# Patient Record
Sex: FEMALE | HISPANIC OR LATINO
[De-identification: names, ages, dates, MRNs, and addresses within clinical notes are randomized per-mention and may not be internally consistent; named-entity substitution may affect disease eponyms.]

---

## 2021-07-09 ENCOUNTER — APPOINTMENT (OUTPATIENT)
Age: 21
End: 2021-07-09

## 2023-12-13 ENCOUNTER — APPOINTMENT (OUTPATIENT)
Dept: ANTEPARTUM | Facility: CLINIC | Age: 23
End: 2023-12-13
Payer: MEDICAID

## 2023-12-13 ENCOUNTER — ASOB RESULT (OUTPATIENT)
Age: 23
End: 2023-12-13

## 2023-12-13 PROCEDURE — 76801 OB US < 14 WKS SINGLE FETUS: CPT

## 2024-03-04 ENCOUNTER — APPOINTMENT (OUTPATIENT)
Dept: ANTEPARTUM | Facility: CLINIC | Age: 24
End: 2024-03-04
Payer: MEDICAID

## 2024-03-04 ENCOUNTER — ASOB RESULT (OUTPATIENT)
Age: 24
End: 2024-03-04

## 2024-03-04 PROCEDURE — 76811 OB US DETAILED SNGL FETUS: CPT

## 2024-03-04 PROCEDURE — 76817 TRANSVAGINAL US OBSTETRIC: CPT | Mod: 59

## 2024-03-05 PROBLEM — Z00.00 ENCOUNTER FOR PREVENTIVE HEALTH EXAMINATION: Status: ACTIVE | Noted: 2024-03-05

## 2024-03-21 ENCOUNTER — APPOINTMENT (OUTPATIENT)
Dept: ANTEPARTUM | Facility: CLINIC | Age: 24
End: 2024-03-21
Payer: MEDICAID

## 2024-03-21 ENCOUNTER — ASOB RESULT (OUTPATIENT)
Age: 24
End: 2024-03-21

## 2024-03-21 PROCEDURE — 76816 OB US FOLLOW-UP PER FETUS: CPT

## 2024-05-06 ENCOUNTER — APPOINTMENT (OUTPATIENT)
Dept: ANTEPARTUM | Facility: CLINIC | Age: 24
End: 2024-05-06
Payer: MEDICAID

## 2024-05-06 ENCOUNTER — ASOB RESULT (OUTPATIENT)
Age: 24
End: 2024-05-06

## 2024-05-06 PROCEDURE — 76817 TRANSVAGINAL US OBSTETRIC: CPT

## 2024-05-06 PROCEDURE — 76816 OB US FOLLOW-UP PER FETUS: CPT

## 2024-06-13 ENCOUNTER — APPOINTMENT (OUTPATIENT)
Dept: ANTEPARTUM | Facility: CLINIC | Age: 24
End: 2024-06-13
Payer: MEDICAID

## 2024-06-13 ENCOUNTER — ASOB RESULT (OUTPATIENT)
Age: 24
End: 2024-06-13

## 2024-06-13 PROCEDURE — 76819 FETAL BIOPHYS PROFIL W/O NST: CPT | Mod: 59

## 2024-06-13 PROCEDURE — 76816 OB US FOLLOW-UP PER FETUS: CPT

## 2024-07-12 RX ORDER — AMPICILLIN TRIHYDRATE 250 MG
1 CAPSULE ORAL EVERY 4 HOURS
Refills: 0 | Status: DISCONTINUED | OUTPATIENT
Start: 2024-07-15 | End: 2024-07-17

## 2024-07-12 RX ORDER — OXYTOCIN 30 [USP'U]/500ML
INJECTION, SOLUTION INTRAVENOUS
Qty: 20 | Refills: 0 | Status: COMPLETED | OUTPATIENT
Start: 2024-07-15 | End: 2024-07-15

## 2024-07-12 RX ORDER — TRISODIUM CITRATE DIHYDRATE AND CITRIC ACID MONOHYDRATE 500; 334 MG/5ML; MG/5ML
30 SOLUTION ORAL ONCE
Refills: 0 | Status: DISCONTINUED | OUTPATIENT
Start: 2024-07-15 | End: 2024-07-17

## 2024-07-12 RX ORDER — DEXTROSE MONOHYDRATE AND SODIUM CHLORIDE 5; .3 G/100ML; G/100ML
1000 INJECTION, SOLUTION INTRAVENOUS
Refills: 0 | Status: DISCONTINUED | OUTPATIENT
Start: 2024-07-15 | End: 2024-07-17

## 2024-07-15 ENCOUNTER — INPATIENT (INPATIENT)
Facility: HOSPITAL | Age: 24
LOS: 3 days | Discharge: HOME | End: 2024-07-19
Attending: OBSTETRICS & GYNECOLOGY | Admitting: OBSTETRICS & GYNECOLOGY
Payer: COMMERCIAL

## 2024-07-15 VITALS — DIASTOLIC BLOOD PRESSURE: 58 MMHG | SYSTOLIC BLOOD PRESSURE: 102 MMHG | HEART RATE: 64 BPM

## 2024-07-15 LAB
ABO RH CONFIRMATION: SIGNIFICANT CHANGE UP
BASOPHILS # BLD AUTO: 0.02 K/UL — SIGNIFICANT CHANGE UP (ref 0–0.2)
BASOPHILS NFR BLD AUTO: 0.2 % — SIGNIFICANT CHANGE UP (ref 0–2)
BLD GP AB SCN SERPL QL: SIGNIFICANT CHANGE UP
EOSINOPHIL # BLD AUTO: 0.03 K/UL — SIGNIFICANT CHANGE UP (ref 0–0.5)
EOSINOPHIL NFR BLD AUTO: 0.4 % — SIGNIFICANT CHANGE UP (ref 0–6)
HCT VFR BLD CALC: 40 % — SIGNIFICANT CHANGE UP (ref 34.5–45)
HGB BLD-MCNC: 13.9 G/DL — SIGNIFICANT CHANGE UP (ref 11.5–15.5)
IMM GRANULOCYTES NFR BLD AUTO: 0.4 % — SIGNIFICANT CHANGE UP (ref 0–0.9)
LYMPHOCYTES # BLD AUTO: 1.83 K/UL — SIGNIFICANT CHANGE UP (ref 1–3.3)
LYMPHOCYTES # BLD AUTO: 21.7 % — SIGNIFICANT CHANGE UP (ref 13–44)
MCHC RBC-ENTMCNC: 31.8 PG — SIGNIFICANT CHANGE UP (ref 27–34)
MCHC RBC-ENTMCNC: 34.8 GM/DL — SIGNIFICANT CHANGE UP (ref 32–36)
MCV RBC AUTO: 91.5 FL — SIGNIFICANT CHANGE UP (ref 80–100)
MONOCYTES # BLD AUTO: 0.89 K/UL — SIGNIFICANT CHANGE UP (ref 0–0.9)
MONOCYTES NFR BLD AUTO: 10.5 % — SIGNIFICANT CHANGE UP (ref 2–14)
NEUTROPHILS # BLD AUTO: 5.65 K/UL — SIGNIFICANT CHANGE UP (ref 1.8–7.4)
NEUTROPHILS NFR BLD AUTO: 66.8 % — SIGNIFICANT CHANGE UP (ref 43–77)
PLATELET # BLD AUTO: 250 K/UL — SIGNIFICANT CHANGE UP (ref 150–400)
RBC # BLD: 4.37 M/UL — SIGNIFICANT CHANGE UP (ref 3.8–5.2)
RBC # FLD: 13.2 % — SIGNIFICANT CHANGE UP (ref 10.3–14.5)
WBC # BLD: 8.45 K/UL — SIGNIFICANT CHANGE UP (ref 3.8–10.5)
WBC # FLD AUTO: 8.45 K/UL — SIGNIFICANT CHANGE UP (ref 3.8–10.5)

## 2024-07-15 RX ORDER — AMPICILLIN TRIHYDRATE 250 MG
2 CAPSULE ORAL ONCE
Refills: 0 | Status: COMPLETED | OUTPATIENT
Start: 2024-07-15 | End: 2024-07-15

## 2024-07-15 RX ADMIN — Medication 108 GRAM(S): at 21:33

## 2024-07-15 RX ADMIN — Medication 200 GRAM(S): at 17:27

## 2024-07-15 RX ADMIN — DEXTROSE MONOHYDRATE AND SODIUM CHLORIDE 125 MILLILITER(S): 5; .3 INJECTION, SOLUTION INTRAVENOUS at 17:30

## 2024-07-15 NOTE — OB PROVIDER H&P - NSHPPHYSICALEXAM_GEN_ALL_CORE
ICU Vital Signs Last 24 Hrs  T(C): 36.9 (15 Jul 2024 16:43), Max: 36.9 (15 Jul 2024 16:43)  T(F): 98.4 (15 Jul 2024 16:43), Max: 98.4 (15 Jul 2024 16:43)  HR: 64 (15 Jul 2024 16:43) (64 - 64)  BP: 102/58 (15 Jul 2024 16:43) (102/58 - 102/58)-  RR: 17 (15 Jul 2024 16:43) (17 - 17)  SpO2: 99%    O2 Parameters below as of 15 Jul 2024 16:43  Patient On (Oxygen Delivery Method): room air      Gen: NAD, well-appearing, AAOx3   Abd: Soft, gravid  Ext: non-tender, non-edematous ICU Vital Signs Last 24 Hrs  T(C): 36.9 (15 Jul 2024 16:43), Max: 36.9 (15 Jul 2024 16:43)  T(F): 98.4 (15 Jul 2024 16:43), Max: 98.4 (15 Jul 2024 16:43)  HR: 64 (15 Jul 2024 16:43) (64 - 64)  BP: 102/58 (15 Jul 2024 16:43) (102/58 - 102/58)-  RR: 17 (15 Jul 2024 16:43) (17 - 17)  SpO2: 99%    O2 Parameters below as of 15 Jul 2024 16:43  Patient On (Oxygen Delivery Method): room air    Gen: NAD, well-appearing, AAOx3   Abd: Soft, gravid  Ext: non-tender, non-edematous  SVE: 1/40/-3 ICU Vital Signs Last 24 Hrs  T(C): 36.9 (15 Jul 2024 16:43), Max: 36.9 (15 Jul 2024 16:43)  T(F): 98.4 (15 Jul 2024 16:43), Max: 98.4 (15 Jul 2024 16:43)  HR: 64 (15 Jul 2024 16:43) (64 - 64)  BP: 102/58 (15 Jul 2024 16:43) (102/58 - 102/58)-  RR: 17 (15 Jul 2024 16:43) (17 - 17)  SpO2: 99%    O2 Parameters below as of 15 Jul 2024 16:43  Patient On (Oxygen Delivery Method): room air    Gen: NAD, well-appearing, AAOx3   Abd: Soft, gravid  Ext: non-tender, non-edematous  SVE: 1/30/-3, soft, anterior

## 2024-07-15 NOTE — OB PROVIDER H&P - NS_PRENATALLABSOURCEHEPATITISCPN_OBGYN_ALL_OB
Detail Level: Zone Initiate Treatment: -\\n- Mupirocin ointment: apply to the affected areas 2-5x a day until resolved\\n- Doxycycline 100 mg: Take 1 capsule po BID x 7 days with food and water Render In Strict Bullet Format?: No Initiate Treatment: -\\n- Triamcinolone 0.1% cream: apply to the affected areas up to bid x 2 weeks on, 1 week off. Repeat prn for flares. Avoid face/groin/armpits negative

## 2024-07-15 NOTE — OB PROVIDER H&P - NSLOWPPHRISK_OBGYN_A_OB
No previous uterine incision/[place] Pregnancy/Less than or equal to 4 previous vaginal births/No other PPH risks indicated

## 2024-07-15 NOTE — OB RN PATIENT PROFILE - NS_PREOPBLOODCONS_OBGYN_ALL_OB
From: Jewel Mendoza  To: John Paul Holder Bryon Mendoza  
n/a

## 2024-07-15 NOTE — OB RN DELIVERY SUMMARY - NSSELHIDDEN_OBGYN_ALL_OB_FT
[NS_DeliveryAttending1_OBGYN_ALL_OB_FT:[id number]=],[NS_DeliveryRN_OBGYN_ALL_OB_FT:[id number]=] [NS_DeliveryAttending1_OBGYN_ALL_OB_FT:[email or web address]=],[NS_DeliveryRN_OBGYN_ALL_OB_FT:[id number]=],[NS_DeliveryAssist1_OBGYN_ALL_OB_FT:[id number]=]

## 2024-07-15 NOTE — OB PROVIDER H&P - HISTORY OF PRESENT ILLNESS
Patient is a 23 year old  at 40w5d by LMP who presents to L&D for elective IOL.       MENA:  7/10/24     LMP: 10/4/23       Pregnancy course: low lying placenta resolved        Obhx:  G1- current pregnancy      Gynhx: denies fibroids, cysts, STIs, abnl paps     Pmhx: denies     Pshx: denies     Meds: PNV, ASA     Allergies: NKDA     Social Hx: denies tobacco, recreational drug, alcohol use during pregnancy. Feels safe at home       Ultrasound:  vertex, posterior placenta      FHT: Category Fetal I tracing    TOCO: regular q8    EFW: 3627g Patient is a 23 year old  at 40w5d by LMP who presents to L&D for elective IOL.   MENA:  7/10/24   LMP: 10/4/23     Pregnancy course: low lying placenta resolved    Obhx:  G1- current pregnancy    Gynhx: denies fibroids, cysts, STIs, abnl paps   Pmhx: denies   Pshx: denies     Meds: PNV, ASA   Allergies: NKDA   Social Hx: denies tobacco, recreational drug, alcohol use during pregnancy. Feels safe at home     Ultrasound:  vertex, posterior placenta    FHT: Category Fetal I tracing    TOCO: irregular every 8min    EFW: 3627g

## 2024-07-15 NOTE — OB PROVIDER H&P - ATTENDING COMMENTS
Primip with IUP @ 52lsf1y admitted for IOL for post dates, EFW about 3700g. GBSpos. Maternal/fetal status reassuring.    -admit  -unfavorable elizabeth score- will start buccal cytotec  -regular diet  -routine admission orders and labs  -GBS pos will be started on Amp for prophylaxis   -desires for nexplanon offered after delivery, desires placement at health center  -we discussed in detail induction of labor, labor, pain management, mode of delivery. All concerns and questions were discussed in detail to patient's satisfaction  -consents explained in detail and signed  ppalos

## 2024-07-15 NOTE — OB RN PATIENT PROFILE - PRO FEEDING PLAN INFANT OB
Tc to the pt. He declined the offer of an . He spoke [place]. The pt was given the entire lab message from the provider. Pt indicated understanding and had no questions.  Eliceo Chen RN initiation of breastfeeding/breast milk feeding

## 2024-07-15 NOTE — OB PROVIDER H&P - ASSESSMENT
A/P: 23Y  @ 40w5d presented for TIOL:     -Admit to L&D  -Consent  -Admission labs  -NPO, except ice chips   -IV fluids  -Labor: Intact Latent labor. Onesimo q8   -Fetus: Cat I tracing. Continuous toco and fetal monitoring.        A/P: 23Y  @ 40w5d presented for TIOL:     -Admit to L&D  -Consented  -Admission labs  -Regular diet  -IV fluids  -Labor: Intact Latent labor. Onesimo q8   -Buccal cyto ordered  -Fetus: Cat I tracing. Continuous toco and fetal monitoring.        A/P: 23Y  @ 40w5d presented for TIOL:     -Admit to L&D  -Consented  -Admission labs  -Regular diet  -IV fluids  -Labor: not in labor. Onesimo irregularly   -Buccal cyto ordered  -Fetus: Cat I tracing. Continuous toco and fetal monitoring.

## 2024-07-15 NOTE — OB RN DELIVERY SUMMARY - NS_SEPSISRSKCALC_OBGYN_ALL_OB_FT
EOS calculated successfully. EOS Risk Factor: 0.5/1000 live births ([facility] national incidence); GA=41w;Temp=100.22; ROM=15.683; GBS='Positive'; Antibiotics='GBS specific antibiotics > 2 hrs prior to birth'

## 2024-07-16 LAB — T PALLIDUM AB TITR SER: NEGATIVE — SIGNIFICANT CHANGE UP

## 2024-07-16 RX ORDER — ONDANSETRON HYDROCHLORIDE 2 MG/ML
4 INJECTION INTRAMUSCULAR; INTRAVENOUS ONCE
Refills: 0 | Status: COMPLETED | OUTPATIENT
Start: 2024-07-16 | End: 2024-07-16

## 2024-07-16 RX ORDER — DEXTROSE MONOHYDRATE AND SODIUM CHLORIDE 5; .3 G/100ML; G/100ML
1000 INJECTION, SOLUTION INTRAVENOUS ONCE
Refills: 0 | Status: COMPLETED | OUTPATIENT
Start: 2024-07-16 | End: 2024-07-16

## 2024-07-16 RX ORDER — DEXTROSE MONOHYDRATE AND SODIUM CHLORIDE 5; .3 G/100ML; G/100ML
500 INJECTION, SOLUTION INTRAVENOUS
Refills: 0 | Status: DISCONTINUED | OUTPATIENT
Start: 2024-07-16 | End: 2024-07-16

## 2024-07-16 RX ORDER — OXYTOCIN 30 [USP'U]/500ML
INJECTION, SOLUTION INTRAVENOUS
Qty: 30 | Refills: 0 | Status: DISCONTINUED | OUTPATIENT
Start: 2024-07-16 | End: 2024-07-17

## 2024-07-16 RX ORDER — DEXTROSE MONOHYDRATE AND SODIUM CHLORIDE 5; .3 G/100ML; G/100ML
500 INJECTION, SOLUTION INTRAVENOUS
Refills: 0 | Status: DISCONTINUED | OUTPATIENT
Start: 2024-07-16 | End: 2024-07-19

## 2024-07-16 RX ORDER — SODIUM CHLORIDE 0.9 % (FLUSH) 0.9 %
500 SYRINGE (ML) INJECTION ONCE
Refills: 0 | Status: COMPLETED | OUTPATIENT
Start: 2024-07-16 | End: 2024-07-16

## 2024-07-16 RX ORDER — SODIUM CHLORIDE 0.9 % (FLUSH) 0.9 %
1000 SYRINGE (ML) INJECTION
Refills: 0 | Status: DISCONTINUED | OUTPATIENT
Start: 2024-07-16 | End: 2024-07-19

## 2024-07-16 RX ADMIN — Medication 108 GRAM(S): at 18:22

## 2024-07-16 RX ADMIN — ONDANSETRON HYDROCHLORIDE 4 MILLIGRAM(S): 2 INJECTION INTRAMUSCULAR; INTRAVENOUS at 20:21

## 2024-07-16 RX ADMIN — Medication 108 GRAM(S): at 01:02

## 2024-07-16 RX ADMIN — Medication 108 GRAM(S): at 09:52

## 2024-07-16 RX ADMIN — DEXTROSE MONOHYDRATE AND SODIUM CHLORIDE 125 MILLILITER(S): 5; .3 INJECTION, SOLUTION INTRAVENOUS at 05:13

## 2024-07-16 RX ADMIN — Medication 108 GRAM(S): at 21:55

## 2024-07-16 RX ADMIN — Medication 500 MILLILITER(S): at 15:30

## 2024-07-16 RX ADMIN — DEXTROSE MONOHYDRATE AND SODIUM CHLORIDE 1000 MILLILITER(S): 5; .3 INJECTION, SOLUTION INTRAVENOUS at 18:00

## 2024-07-16 RX ADMIN — DEXTROSE MONOHYDRATE AND SODIUM CHLORIDE 125 MILLILITER(S): 5; .3 INJECTION, SOLUTION INTRAVENOUS at 20:35

## 2024-07-16 RX ADMIN — Medication 108 GRAM(S): at 05:14

## 2024-07-16 RX ADMIN — OXYTOCIN 2 MILLIUNIT(S)/MIN: 30 INJECTION, SOLUTION INTRAVENOUS at 05:13

## 2024-07-16 RX ADMIN — Medication 125 MILLILITER(S): at 16:00

## 2024-07-16 RX ADMIN — DEXTROSE MONOHYDRATE AND SODIUM CHLORIDE 125 MILLILITER(S): 5; .3 INJECTION, SOLUTION INTRAVENOUS at 09:52

## 2024-07-16 RX ADMIN — DEXTROSE MONOHYDRATE AND SODIUM CHLORIDE 1000 MILLILITER(S): 5; .3 INJECTION, SOLUTION INTRAVENOUS at 09:52

## 2024-07-16 RX ADMIN — Medication 108 GRAM(S): at 13:36

## 2024-07-16 RX ADMIN — OXYTOCIN 2 MILLIUNIT(S)/MIN: 30 INJECTION, SOLUTION INTRAVENOUS at 09:52

## 2024-07-16 NOTE — OB PROVIDER LABOR PROGRESS NOTE - ASSESSMENT
-VSS  -COOK balloon in place.  -Epidural in place.  -Will transition to pitocin. -VSS  -COOK balloon in place.  -Epidural in place.  -Will transition to pitocin.      agree with above  maternal/fetal status reassuring  ppalos

## 2024-07-16 NOTE — OB PROVIDER LABOR PROGRESS NOTE - NS_SUBJECTIVE/OBJECTIVE_OBGYN_ALL_OB_FT
patient is feeling her contractions    Vital Signs Last 24 Hrs  T(C): 37.1 (16 Jul 2024 21:29), Max: 37.1 (16 Jul 2024 21:29)  T(F): 98.78 (16 Jul 2024 21:29), Max: 98.78 (16 Jul 2024 21:29)  HR: 83 (16 Jul 2024 22:50) (64 - 110)  BP: 116/78 (16 Jul 2024 22:50) (82/54 - 118/82)  RR: 18 (16 Jul 2024 09:30) (18 - 18)  SpO2: 98% (16 Jul 2024 22:49) (96% - 100%)
Patient with no current complaints.
patient is doing well, no complaints at bedside    Vital Signs Last 24 Hrs  T(C): 36.8 (15 Jul 2024 18:50), Max: 36.9 (15 Jul 2024 16:43)  T(F): 98.24 (15 Jul 2024 18:50), Max: 98.4 (15 Jul 2024 16:43)  HR: 67 (15 Jul 2024 21:35) (64 - 80)  BP: 109/55 (15 Jul 2024 21:35) (102/58 - 109/55)  RR: 18 (15 Jul 2024 18:50) (17 - 18)  Parameters below as of 15 Jul 2024 16:43  Patient On (Oxygen Delivery Method): room air

## 2024-07-16 NOTE — OB PROVIDER LABOR PROGRESS NOTE - ASSESSMENT
eIOL @ 40w6d    - maternal vital signs stable  - cat I tracing, continue to monitor   - CB placed 80/80  - received 2 doses of bcyto  - continue to monitor labor progress eIOL @ 40w6d    - maternal vital signs stable  - cat I tracing, continue to monitor   - CB placed 80/80  - received 2 doses of bcyto  - continue to monitor labor progress    agree with above  maternal/fetal status reassuring  ppalos

## 2024-07-16 NOTE — OB PROVIDER LABOR PROGRESS NOTE - NS_OBIHIFHRDETAILS_OBGYN_ALL_OB_FT
145, moderate variability, + accels, + variables
baseline FHR 145s, moderate variability, +accels, -decels
baseline 140bpm, moderate variability, accels, + variable decel, + late decel
baseline 150bpm, moderate variability, -accels, -decels

## 2024-07-17 ENCOUNTER — TRANSCRIPTION ENCOUNTER (OUTPATIENT)
Age: 24
End: 2024-07-17

## 2024-07-17 RX ORDER — OXYTOCIN 30 [USP'U]/500ML
41.67 INJECTION, SOLUTION INTRAVENOUS
Qty: 20 | Refills: 0 | Status: DISCONTINUED | OUTPATIENT
Start: 2024-07-17 | End: 2024-07-19

## 2024-07-17 RX ORDER — HYDROCORTISONE VALERATE 0.2 %
1 CREAM (GRAM) TOPICAL EVERY 6 HOURS
Refills: 0 | Status: DISCONTINUED | OUTPATIENT
Start: 2024-07-17 | End: 2024-07-19

## 2024-07-17 RX ORDER — DIPHENHYDRAMINE HCL 12.5MG/5ML
25 ELIXIR ORAL EVERY 6 HOURS
Refills: 0 | Status: DISCONTINUED | OUTPATIENT
Start: 2024-07-17 | End: 2024-07-19

## 2024-07-17 RX ORDER — ACETAMINOPHEN 325 MG
975 TABLET ORAL
Refills: 0 | Status: DISCONTINUED | OUTPATIENT
Start: 2024-07-17 | End: 2024-07-19

## 2024-07-17 RX ORDER — KETOROLAC TROMETHAMINE 30 MG/ML
30 INJECTION, SOLUTION INTRAMUSCULAR ONCE
Refills: 0 | Status: DISCONTINUED | OUTPATIENT
Start: 2024-07-17 | End: 2024-07-17

## 2024-07-17 RX ORDER — OXYCODONE HYDROCHLORIDE 100 MG/5ML
5 SOLUTION ORAL
Refills: 0 | Status: DISCONTINUED | OUTPATIENT
Start: 2024-07-17 | End: 2024-07-19

## 2024-07-17 RX ORDER — LANOLIN
1 WAX (GRAM) MISCELLANEOUS EVERY 6 HOURS
Refills: 0 | Status: DISCONTINUED | OUTPATIENT
Start: 2024-07-17 | End: 2024-07-19

## 2024-07-17 RX ORDER — HYDROCORTISONE ACETATE 1 %
1 OINTMENT (GRAM) RECTAL EVERY 4 HOURS
Refills: 0 | Status: DISCONTINUED | OUTPATIENT
Start: 2024-07-17 | End: 2024-07-19

## 2024-07-17 RX ORDER — PRAMOXINE HCL 1 %
1 CREAM (GRAM) RECTAL EVERY 4 HOURS
Refills: 0 | Status: DISCONTINUED | OUTPATIENT
Start: 2024-07-17 | End: 2024-07-19

## 2024-07-17 RX ORDER — BENZOCAINE 15 %
1 LIQUID (ML) TOPICAL EVERY 6 HOURS
Refills: 0 | Status: DISCONTINUED | OUTPATIENT
Start: 2024-07-17 | End: 2024-07-19

## 2024-07-17 RX ORDER — OXYCODONE HYDROCHLORIDE 100 MG/5ML
5 SOLUTION ORAL ONCE
Refills: 0 | Status: DISCONTINUED | OUTPATIENT
Start: 2024-07-17 | End: 2024-07-19

## 2024-07-17 RX ORDER — TETANUS TOXOID, REDUCED DIPHTHERIA TOXOID AND ACELLULAR PERTUSSIS VACCINE, ADSORBED 5; 2.5; 8; 8; 2.5 [IU]/.5ML; [IU]/.5ML; UG/.5ML; UG/.5ML; UG/.5ML
0.5 SUSPENSION INTRAMUSCULAR ONCE
Refills: 0 | Status: DISCONTINUED | OUTPATIENT
Start: 2024-07-17 | End: 2024-07-19

## 2024-07-17 RX ORDER — SODIUM CHLORIDE 0.9 % (FLUSH) 0.9 %
3 SYRINGE (ML) INJECTION EVERY 8 HOURS
Refills: 0 | Status: DISCONTINUED | OUTPATIENT
Start: 2024-07-17 | End: 2024-07-19

## 2024-07-17 RX ORDER — DIBUCAINE 1 %
1 OINTMENT (GRAM) TOPICAL EVERY 6 HOURS
Refills: 0 | Status: DISCONTINUED | OUTPATIENT
Start: 2024-07-17 | End: 2024-07-19

## 2024-07-17 RX ORDER — SIMETHICONE 40MG/0.6ML
80 SUSPENSION, DROPS(FINAL DOSAGE FORM)(ML) ORAL EVERY 4 HOURS
Refills: 0 | Status: DISCONTINUED | OUTPATIENT
Start: 2024-07-17 | End: 2024-07-19

## 2024-07-17 RX ORDER — PRENATAL VIT/IRON FUM/FOLIC AC 60 MG-1 MG
1 TABLET ORAL DAILY
Refills: 0 | Status: DISCONTINUED | OUTPATIENT
Start: 2024-07-17 | End: 2024-07-19

## 2024-07-17 RX ADMIN — Medication 600 MILLIGRAM(S): at 17:48

## 2024-07-17 RX ADMIN — Medication 975 MILLIGRAM(S): at 20:54

## 2024-07-17 RX ADMIN — Medication 1 TABLET(S): at 17:49

## 2024-07-17 RX ADMIN — Medication 600 MILLIGRAM(S): at 11:30

## 2024-07-17 RX ADMIN — Medication 108 GRAM(S): at 01:55

## 2024-07-17 RX ADMIN — KETOROLAC TROMETHAMINE 30 MILLIGRAM(S): 30 INJECTION, SOLUTION INTRAMUSCULAR at 04:31

## 2024-07-17 RX ADMIN — Medication 975 MILLIGRAM(S): at 14:39

## 2024-07-17 RX ADMIN — OXYTOCIN 1000 MILLIUNIT(S)/MIN: 30 INJECTION, SOLUTION INTRAVENOUS at 03:46

## 2024-07-17 RX ADMIN — Medication 975 MILLIGRAM(S): at 08:30

## 2024-07-17 NOTE — CHART NOTE - NSCHARTNOTEFT_GEN_A_CORE
24yo p0 at 40w - IOL  c/o pressure  on pitocin drip  cervix 5/50/-2/iupc placed  , mod bettie, accels, occasional variable decels  toco q 2-3  plan: amnioinfusion if variables persist
feels no pressure (recent epidural top off)  cervix 10/100/0 station  tracing 150 mod bettie, no accels, no decels  toco q 2min  anticipating to start pushing soon once she starts to feel pressure
feels some pressure  cx 6/80/-1  tracing 140, mod bettie, no accels, no decels  toco q3  discussed risks of protracted labor, arrest,   will monitor labor progress and FHR tracing
patient undegoing IOL after 40w  has epidural  on pitocin and cook balloon  tracing cat 1
24yo p0 at 40w5d  IOL on pitocin  last exam 6cm  tracing intermittent cat 2  will provide resucitative measures as needed  will cont to monitor progress of labor
Patient undergoing IOL for > 40w elective  has epidural and comfortable  on pitocin drip  cook balloon out  cervix 5cm/50%  fhr cat 1  toco q 2-3  will continue monitoring fhr and labor progress
p0 at 40w5d - IOL  feels comfortable with epidural  last exam was 5cm  cat 2 due to variable decels resolved with amnioinfusion  plan: resume pitocin

## 2024-07-17 NOTE — OB PROVIDER DELIVERY SUMMARY - NS_DELIVERYASSIST1_OBGYN_ALL_OB_FT
After checking in but before talking to triage RN , pt walked up to the  and stated that she wanted to leave.  \"I'm just impatient.\"   Maxi Ho DO

## 2024-07-17 NOTE — DISCHARGE NOTE OB - NS MD DC FALL RISK RISK
For information on Fall & Injury Prevention, visit: [email or web address] OR  [email or web address] OR  https://www.cdc.gov/steadi/patient.html

## 2024-07-17 NOTE — DISCHARGE NOTE OB - PATIENT PORTAL LINK FT
You can access the FollowMyHealth Patient Portal offered by [facility] by registering at the following website: [email or web address]. By joining [facility]’s FollowMyHealth portal, you will also be able to view your health information using other applications (apps) compatible with our system.

## 2024-07-17 NOTE — OB PROVIDER DELIVERY SUMMARY - NSPROVIDERDELIVERYNOTE_OBGYN_ALL_OB_FT
, uncomplicated, f infant, apgars 9/9  placenta delivered spontaneously, intact  vagina and perineum inspected, 2nd degree laceration repaired with 2-0 vicryl  fundus firm, hemostasis noted  EBL 126cc  count correct  infant recovering in LDR

## 2024-07-17 NOTE — DISCHARGE NOTE OB - CARE PROVIDER_API CALL
Jah Browne  Obstetrics and Gynecology  [facility] [place] [address]  Phone: [phone]  Fax: [phone]  Follow Up Time:

## 2024-07-18 LAB
HCT VFR BLD CALC: 32.8 % — LOW (ref 34.5–45)
HGB BLD-MCNC: 11.4 G/DL — LOW (ref 11.5–15.5)

## 2024-07-18 RX ADMIN — Medication 600 MILLIGRAM(S): at 05:14

## 2024-07-18 RX ADMIN — Medication 600 MILLIGRAM(S): at 17:42

## 2024-07-18 RX ADMIN — Medication 975 MILLIGRAM(S): at 14:41

## 2024-07-18 RX ADMIN — Medication 975 MILLIGRAM(S): at 09:03

## 2024-07-18 RX ADMIN — Medication 975 MILLIGRAM(S): at 21:32

## 2024-07-18 RX ADMIN — Medication 1 TABLET(S): at 13:02

## 2024-07-18 RX ADMIN — Medication 600 MILLIGRAM(S): at 13:02

## 2024-07-18 NOTE — PROGRESS NOTE ADULT - ATTENDING COMMENTS
Pt is a 24yo  s/p  and doing well on PPD 1  Pt c/o mild cramping tolerable.  Bleeding is not heavy  She is tolerating diet and voiding  continue routine PP care.  Pt wishes to have nexplanon placed as outpatient when she returns to [facility] for her PP visit.    TAYLOR Bailey (OB hospitalist)

## 2024-07-19 VITALS
HEART RATE: 71 BPM | OXYGEN SATURATION: 99 % | RESPIRATION RATE: 18 BRPM | TEMPERATURE: 98 F | SYSTOLIC BLOOD PRESSURE: 113 MMHG | DIASTOLIC BLOOD PRESSURE: 68 MMHG

## 2024-07-19 PROCEDURE — 59050 FETAL MONITOR W/REPORT: CPT

## 2024-07-19 PROCEDURE — 85018 HEMOGLOBIN: CPT

## 2024-07-19 PROCEDURE — T1013: CPT

## 2024-07-19 PROCEDURE — 86850 RBC ANTIBODY SCREEN: CPT

## 2024-07-19 PROCEDURE — 86901 BLOOD TYPING SEROLOGIC RH(D): CPT

## 2024-07-19 PROCEDURE — 76815 OB US LIMITED FETUS(S): CPT

## 2024-07-19 PROCEDURE — 85025 COMPLETE CBC W/AUTO DIFF WBC: CPT

## 2024-07-19 PROCEDURE — 86780 TREPONEMA PALLIDUM: CPT

## 2024-07-19 PROCEDURE — 86900 BLOOD TYPING SEROLOGIC ABO: CPT

## 2024-07-19 PROCEDURE — 85014 HEMATOCRIT: CPT

## 2024-07-19 PROCEDURE — 36415 COLL VENOUS BLD VENIPUNCTURE: CPT

## 2024-07-19 RX ORDER — ACETAMINOPHEN 325 MG
3 TABLET ORAL
Qty: 60 | Refills: 0
Start: 2024-07-19 | End: 2024-07-23

## 2024-07-19 RX ORDER — PRENATAL VIT/IRON FUM/FOLIC AC 60 MG-1 MG
1 TABLET ORAL
Qty: 0 | Refills: 0 | DISCHARGE
Start: 2024-07-19

## 2024-07-19 RX ADMIN — Medication 600 MILLIGRAM(S): at 05:24

## 2024-07-19 RX ADMIN — Medication 975 MILLIGRAM(S): at 09:22

## 2024-07-19 RX ADMIN — Medication 975 MILLIGRAM(S): at 02:02

## 2024-07-19 NOTE — PROGRESS NOTE ADULT - SUBJECTIVE AND OBJECTIVE BOX
INTERVAL HPI/OVERNIGHT EVENTS:  23y Female s/p labor epidural on 7/16 with delivery 7/17    Vital Signs Last 24 Hrs  T(C): 36.7 (18 Jul 2024 04:30), Max: 36.7 (18 Jul 2024 04:30)  T(F): 98.1 (18 Jul 2024 04:30), Max: 98.1 (18 Jul 2024 04:30)  HR: 60 (18 Jul 2024 04:30) (60 - 70)  BP: 110/73 (18 Jul 2024 04:30) (100/61 - 110/73)  BP(mean): --  RR: 18 (18 Jul 2024 04:30) (18 - 18)  SpO2: 99% (18 Jul 2024 04:30) (96% - 99%)    Parameters below as of 17 Jul 2024 16:16  Patient On (Oxygen Delivery Method): room air            Patient's overall anesthesia satisfaction: Positive    Patient doing well     No headache      No residual numbness or weakness, sensory and motor function intact    No anesthetic complications or complaints noted or reported                 
VIJI KAY is a 23y  now PPD#1 s/p spontaneous vaginal delivery at 40weeks 5 days gestation, uncomplicated.    S:    No acute events overnight.   The patient has no complaints.  Pain controlled with current treatment regimen.   She is ambulating without difficulty and tolerating PO.   + flatus/-BM/+ voiding   She endorses appropriate lochia, which is decreasing.   She denies fevers, chills, nausea and vomiting.   She denies lightheadedness, dizziness, palpitations, chest pain and SOB.     O:    T(C): 36.7 (24 @ 04:30), Max: 36.7 (24 @ 08:00)  HR: 60 (24 @ 04:30) (60 - 70)  BP: 110/73 (24 @ 04:30) (92/57 - 110/73)  RR: 18 (24 @ 04:30) (18 - 18)  SpO2: 99% (24 @ 04:30) (96% - 99%)    Gen: NAD, AOx3  Abdomen:  Soft, non-tender, non-distended  Uterus:  Fundus firm below umbilicus  VE:  Expectant lochia  Ext:  Non-tender and non-edematous
S: Patient doing well. Minimal lochia. No complaints.     O: Vital Signs Last 24 Hrs  T(C): 36.5 (2024 04:49), Max: 36.7 (2024 15:53)  T(F): 97.7 (2024 04:49), Max: 98 (2024 15:53)  HR: 71 (2024 04:49) (62 - 71)  BP: 113/68 (2024 04:49) (97/61 - 113/68)  BP(mean): --  RR: 18 (2024 04:49) (18 - 18)  SpO2: 99% (2024 04:49) (98% - 99%)    Parameters below as of 2024 04:49  Patient On (Oxygen Delivery Method): room air        Gen: NAD  Breast : breast feeding  Abd: soft, NT, ND, fundus firm below umbilicus  Ext: no tenderness    Labs:                        11.4   x     )-----------( x        ( 2024 06:18 )             32.8            PP # 2 s/p     Doing well.  Discharge home  today  Nothing in vagina and no heavy lifting for 6 weeks.   Follow up 4 weeks for post partum visit.  Call office for any fevers, chills, heavy vaginal bleeding, symptoms of depression, or any other concerning symptoms.  Continue motrin 600 mg every 6 hours.              
VIJI KAY is a 23y  now PPD#2 s/p spontaneous vaginal delivery at 40 weeks 5 days gestation, uncomplicated.    S:    No acute events overnight.   The patient has no complaints.  Pain controlled with current treatment regimen.   She is ambulating without difficulty and tolerating PO.   + flatus/-BM/+ voiding   She endorses appropriate lochia, which is decreasing.   She denies fevers, chills, nausea and vomiting.   She denies lightheadedness, dizziness, palpitations, chest pain and SOB.     O:    T(C): 36.5 (24 @ 04:49), Max: 36.7 (24 @ 15:53)  HR: 71 (24 @ 04:49) (62 - 71)  BP: 113/68 (24 @ 04:49) (97/61 - 113/68)  RR: 18 (24 @ 04:49) (18 - 18)  SpO2: 99% (24 @ 04:49) (98% - 99%)    Gen: NAD, AOx3  Abdomen:  Soft, non-tender, non-distended  Uterus:  Fundus firm below umbilicus  VE:  Expectant lochia  Ext:  Non-tender and non-edematous                          11.4   x     )-----------( x        ( 2024 06:18 )             32.8

## 2024-07-19 NOTE — PROGRESS NOTE ADULT - ASSESSMENT
A/P:  23y  now PPD#1 s/p spontaneous vaginal delivery at 40weeks 5 days gestation, uncomplicated.  -Vital signs stable  -Hgb: 13.9 -> AM labs pending   -Voiding, tolerating PO, bowel function nml   -Advance care as tolerated   -Continue routine postpartum care and education  -Healthy female infant  -Dispo: Patient to be discharged when meeting all postpartum and postoperative milestones and pending attending approval.
A/P:  23y  now PPD#2 s/p spontaneous vaginal delivery at 40 weeks 5 days gestation, uncomplicated.  -Vital signs stable  -Hgb: 11.4 -> AM labs pending   -Voiding, tolerating PO, bowel function nml   -Advance care as tolerated   -Continue routine postpartum care and education  -Healthy female infant  -Dispo: Pt is stable, doing well and meeting all postpartum and postoperative milestones. Possible discharge to home today pending attending approval.

## 2024-07-25 ENCOUNTER — APPOINTMENT (OUTPATIENT)
Dept: PEDIATRIC ALLERGY IMMUNOLOGY | Facility: CLINIC | Age: 24
End: 2024-07-25
Payer: MEDICAID

## 2024-07-25 DIAGNOSIS — Z78.9 OTHER SPECIFIED HEALTH STATUS: ICD-10-CM

## 2024-07-25 PROCEDURE — 36415 COLL VENOUS BLD VENIPUNCTURE: CPT

## 2024-07-26 ENCOUNTER — NON-APPOINTMENT (OUTPATIENT)
Age: 24
End: 2024-07-26

## 2024-07-26 LAB
CMV DNA SPEC QL NAA+PROBE: NOT DETECTED IU/ML
CMV IGG SERPL QL: 6 U/ML
CMV IGG SERPL-IMP: POSITIVE
CMV IGM SERPL QL: <8 AU/ML
CMV IGM SERPL QL: NEGATIVE
CMVPCR LOG: NOT DETECTED LOG10IU/ML

## 2024-07-28 PROBLEM — Z78.9 CMV (CYTOMEGALOVIRUS INFECTION) STATUS UNKNOWN: Status: ACTIVE | Noted: 2024-07-28

## 2024-11-15 NOTE — OB PROVIDER LABOR PROGRESS NOTE - ASSESSMENT
Is This A New Presentation, Or A Follow-Up?: Skin Lesion eIOL   - maternal VSS  - cat II tracing, patient placed on her side with a peanut ball between her legs   - continue to monitor the FHT  - IUPC/amnio still in place  - continue to monitor labor progress eIOL   - maternal VSS  - cat II tracing, patient placed on her side with a peanut ball between her legs   - late decel occurred when patient was placed in supine for cervical exam  - continue to monitor the FHT  - continue to monitor labor progress